# Patient Record
Sex: MALE | Race: WHITE | URBAN - METROPOLITAN AREA
[De-identification: names, ages, dates, MRNs, and addresses within clinical notes are randomized per-mention and may not be internally consistent; named-entity substitution may affect disease eponyms.]

---

## 2024-01-23 ENCOUNTER — APPOINTMENT (RX ONLY)
Dept: URBAN - METROPOLITAN AREA CLINIC 51 | Facility: CLINIC | Age: 79
Setting detail: DERMATOLOGY
End: 2024-01-23

## 2024-01-23 PROBLEM — C44.719 BASAL CELL CARCINOMA OF SKIN OF LEFT LOWER LIMB, INCLUDING HIP: Status: ACTIVE | Noted: 2024-01-23

## 2024-01-23 PROCEDURE — 99203 OFFICE O/P NEW LOW 30 MIN: CPT

## 2024-01-23 PROCEDURE — ? CONSULTATION FOR SURGERY

## 2024-01-23 NOTE — PROCEDURE: CONSULTATION FOR SURGERY
Detail Level: Detailed
Size Of Lesion: 1.7
X Size Of Lesion In Cm (Optional): 1.5
Anatomic Location From Referring Provider: Left proximal pretibial (biopsied as Mid L shin)
Incorporate Mauc In Note: Yes

## 2024-01-23 NOTE — HPI: SURGICAL CONSULTATION
[id number]: previous_biopsy_has_been_previously_biopsied
What Is The Location Of The Lesion?: Left proximal pretibial (biopsied as Mid L shin)
Who Is Your Referring Physician?: Adam painter

## 2024-02-27 ENCOUNTER — APPOINTMENT (RX ONLY)
Dept: URBAN - METROPOLITAN AREA CLINIC 51 | Facility: CLINIC | Age: 79
Setting detail: DERMATOLOGY
End: 2024-02-27

## 2024-02-27 PROBLEM — C44.719 BASAL CELL CARCINOMA OF SKIN OF LEFT LOWER LIMB, INCLUDING HIP: Status: ACTIVE | Noted: 2024-02-27

## 2024-02-27 PROCEDURE — ? ADDITIONAL NOTES

## 2024-02-27 PROCEDURE — 99212 OFFICE O/P EST SF 10 MIN: CPT

## 2024-02-27 PROCEDURE — ? POST-OP WOUND CHECK (NO GLOBAL PERIOD)

## 2024-02-27 NOTE — PROCEDURE: ADDITIONAL NOTES
Render Risk Assessment In Note?: no
Detail Level: Simple
Additional Notes: Pt came in for unna boot placement due to upcoming Mohs next week. However, his oncologist and him would like us to hold off on this surgery until he finishes upcoming scan results/procedure. Pt will keep up advised on when he can have surgery and we will place unna boot one week prior.

## 2025-03-08 ENCOUNTER — APPOINTMENT (OUTPATIENT)
Dept: CT IMAGING | Age: 80
DRG: 205 | End: 2025-03-08
Payer: MEDICARE

## 2025-03-08 ENCOUNTER — HOSPITAL ENCOUNTER (INPATIENT)
Age: 80
LOS: 2 days | Discharge: HOME | DRG: 205 | End: 2025-03-10
Attending: EMERGENCY MEDICINE
Payer: MEDICARE

## 2025-03-08 DIAGNOSIS — R09.02 HYPOXIA: Primary | ICD-10-CM

## 2025-03-08 DIAGNOSIS — J18.9 PNEUMONIA OF BOTH LOWER LOBES DUE TO INFECTIOUS ORGANISM: ICD-10-CM

## 2025-03-08 PROBLEM — T50.905A DRUG-INDUCED PNEUMONITIS: Status: ACTIVE | Noted: 2025-03-08

## 2025-03-08 PROBLEM — E78.5 HLD (HYPERLIPIDEMIA): Status: ACTIVE | Noted: 2025-03-08

## 2025-03-08 PROBLEM — I10 PRIMARY HYPERTENSION: Status: ACTIVE | Noted: 2025-03-08

## 2025-03-08 PROBLEM — J15.9 COMMUNITY ACQUIRED BACTERIAL PNEUMONIA: Status: ACTIVE | Noted: 2025-03-08

## 2025-03-08 PROBLEM — D68.69 SECONDARY HYPERCOAGULABLE STATE: Status: ACTIVE | Noted: 2025-03-08

## 2025-03-08 PROBLEM — I48.91 ATRIAL FIBRILLATION (HCC): Status: ACTIVE | Noted: 2025-03-08

## 2025-03-08 PROBLEM — J96.01 ACUTE HYPOXIC RESPIRATORY FAILURE (HCC): Status: ACTIVE | Noted: 2025-03-08

## 2025-03-08 PROBLEM — C44.92: Status: ACTIVE | Noted: 2025-03-08

## 2025-03-08 PROBLEM — J98.4 DRUG-INDUCED PNEUMONITIS: Status: ACTIVE | Noted: 2025-03-08

## 2025-03-08 PROBLEM — C77.8: Status: ACTIVE | Noted: 2025-03-08

## 2025-03-08 LAB
ANION GAP SERPL CALC-SCNC: 13 MMOL/L (ref 7–16)
B PERT DNA SPEC QL NAA+PROBE: NOT DETECTED
BORDETELLA PARAPERTUSSIS BY PCR: NOT DETECTED
BUN SERPL-MCNC: 22 MG/DL (ref 8–23)
C PNEUM DNA SPEC QL NAA+PROBE: NOT DETECTED
CALCIUM SERPL-MCNC: 8.4 MG/DL (ref 8.8–10.2)
CHLORIDE SERPL-SCNC: 108 MMOL/L (ref 98–107)
CO2 SERPL-SCNC: 23 MMOL/L (ref 20–29)
CREAT SERPL-MCNC: 1.42 MG/DL (ref 0.8–1.3)
ERYTHROCYTE [DISTWIDTH] IN BLOOD BY AUTOMATED COUNT: 13.4 % (ref 11.9–14.6)
FLUAV RNA SPEC QL NAA+PROBE: NOT DETECTED
FLUAV SUBTYP SPEC NAA+PROBE: NOT DETECTED
FLUBV RNA SPEC QL NAA+PROBE: NOT DETECTED
FLUBV RNA SPEC QL NAA+PROBE: NOT DETECTED
GLUCOSE SERPL-MCNC: 143 MG/DL (ref 70–99)
HADV DNA SPEC QL NAA+PROBE: NOT DETECTED
HCOV 229E RNA SPEC QL NAA+PROBE: NOT DETECTED
HCOV HKU1 RNA SPEC QL NAA+PROBE: NOT DETECTED
HCOV NL63 RNA SPEC QL NAA+PROBE: NOT DETECTED
HCOV OC43 RNA SPEC QL NAA+PROBE: NOT DETECTED
HCT VFR BLD AUTO: 40.3 % (ref 41.1–50.3)
HGB BLD-MCNC: 13.7 G/DL (ref 13.6–17.2)
HMPV RNA SPEC QL NAA+PROBE: NOT DETECTED
HPIV1 RNA SPEC QL NAA+PROBE: NOT DETECTED
HPIV2 RNA SPEC QL NAA+PROBE: NOT DETECTED
HPIV3 RNA SPEC QL NAA+PROBE: NOT DETECTED
HPIV4 RNA SPEC QL NAA+PROBE: NOT DETECTED
INR PPP: 2
LACTATE SERPL-SCNC: 2 MMOL/L (ref 0.5–2)
M PNEUMO DNA SPEC QL NAA+PROBE: NOT DETECTED
MCH RBC QN AUTO: 29.9 PG (ref 26.1–32.9)
MCHC RBC AUTO-ENTMCNC: 34 G/DL (ref 31.4–35)
MCV RBC AUTO: 88 FL (ref 82–102)
NRBC # BLD: 0 K/UL (ref 0–0.2)
PLATELET # BLD AUTO: 241 K/UL (ref 150–450)
PMV BLD AUTO: 8.9 FL (ref 9.4–12.3)
POTASSIUM SERPL-SCNC: 3.9 MMOL/L (ref 3.5–5.1)
PROCALCITONIN SERPL-MCNC: 0.1 NG/ML (ref 0–0.1)
PROTHROMBIN TIME: 22.3 SEC (ref 11.3–14.9)
RBC # BLD AUTO: 4.58 M/UL (ref 4.23–5.6)
RSV RNA SPEC QL NAA+PROBE: NOT DETECTED
RV+EV RNA SPEC QL NAA+PROBE: NOT DETECTED
SARS-COV-2 RDRP RESP QL NAA+PROBE: NOT DETECTED
SARS-COV-2 RNA RESP QL NAA+PROBE: NOT DETECTED
SODIUM SERPL-SCNC: 144 MMOL/L (ref 136–145)
SOURCE: NORMAL
WBC # BLD AUTO: 6.3 K/UL (ref 4.3–11.1)

## 2025-03-08 PROCEDURE — 6360000002 HC RX W HCPCS: Performed by: EMERGENCY MEDICINE

## 2025-03-08 PROCEDURE — 84145 PROCALCITONIN (PCT): CPT

## 2025-03-08 PROCEDURE — 87635 SARS-COV-2 COVID-19 AMP PRB: CPT

## 2025-03-08 PROCEDURE — 97530 THERAPEUTIC ACTIVITIES: CPT

## 2025-03-08 PROCEDURE — 96374 THER/PROPH/DIAG INJ IV PUSH: CPT

## 2025-03-08 PROCEDURE — 6360000004 HC RX CONTRAST MEDICATION: Performed by: EMERGENCY MEDICINE

## 2025-03-08 PROCEDURE — 2500000003 HC RX 250 WO HCPCS

## 2025-03-08 PROCEDURE — 87040 BLOOD CULTURE FOR BACTERIA: CPT

## 2025-03-08 PROCEDURE — 2580000003 HC RX 258: Performed by: EMERGENCY MEDICINE

## 2025-03-08 PROCEDURE — 0202U NFCT DS 22 TRGT SARS-COV-2: CPT

## 2025-03-08 PROCEDURE — 80048 BASIC METABOLIC PNL TOTAL CA: CPT

## 2025-03-08 PROCEDURE — 1100000003 HC PRIVATE W/ TELEMETRY

## 2025-03-08 PROCEDURE — 87641 MR-STAPH DNA AMP PROBE: CPT

## 2025-03-08 PROCEDURE — 85027 COMPLETE CBC AUTOMATED: CPT

## 2025-03-08 PROCEDURE — 85610 PROTHROMBIN TIME: CPT

## 2025-03-08 PROCEDURE — 83605 ASSAY OF LACTIC ACID: CPT

## 2025-03-08 PROCEDURE — 97161 PT EVAL LOW COMPLEX 20 MIN: CPT

## 2025-03-08 PROCEDURE — 6360000002 HC RX W HCPCS

## 2025-03-08 PROCEDURE — 99285 EMERGENCY DEPT VISIT HI MDM: CPT

## 2025-03-08 PROCEDURE — 36415 COLL VENOUS BLD VENIPUNCTURE: CPT

## 2025-03-08 PROCEDURE — 71260 CT THORAX DX C+: CPT

## 2025-03-08 PROCEDURE — 2580000003 HC RX 258

## 2025-03-08 PROCEDURE — 87502 INFLUENZA DNA AMP PROBE: CPT

## 2025-03-08 PROCEDURE — 6370000000 HC RX 637 (ALT 250 FOR IP)

## 2025-03-08 RX ORDER — PRAVASTATIN SODIUM 20 MG
20 TABLET ORAL DAILY
COMMUNITY

## 2025-03-08 RX ORDER — ENOXAPARIN SODIUM 100 MG/ML
30 INJECTION SUBCUTANEOUS 2 TIMES DAILY
Status: DISCONTINUED | OUTPATIENT
Start: 2025-03-08 | End: 2025-03-08

## 2025-03-08 RX ORDER — AMLODIPINE BESYLATE 5 MG/1
5 TABLET ORAL DAILY
COMMUNITY

## 2025-03-08 RX ORDER — POLYETHYLENE GLYCOL 3350 17 G/17G
17 POWDER, FOR SOLUTION ORAL DAILY PRN
Status: DISCONTINUED | OUTPATIENT
Start: 2025-03-08 | End: 2025-03-10 | Stop reason: HOSPADM

## 2025-03-08 RX ORDER — SODIUM CHLORIDE 9 MG/ML
INJECTION, SOLUTION INTRAVENOUS PRN
Status: DISCONTINUED | OUTPATIENT
Start: 2025-03-08 | End: 2025-03-10 | Stop reason: HOSPADM

## 2025-03-08 RX ORDER — POTASSIUM CHLORIDE 7.45 MG/ML
10 INJECTION INTRAVENOUS PRN
Status: DISCONTINUED | OUTPATIENT
Start: 2025-03-08 | End: 2025-03-10 | Stop reason: HOSPADM

## 2025-03-08 RX ORDER — POTASSIUM CHLORIDE 1500 MG/1
40 TABLET, EXTENDED RELEASE ORAL PRN
Status: DISCONTINUED | OUTPATIENT
Start: 2025-03-08 | End: 2025-03-10 | Stop reason: HOSPADM

## 2025-03-08 RX ORDER — SODIUM CHLORIDE 9 MG/ML
INJECTION, SOLUTION INTRAVENOUS CONTINUOUS
Status: DISCONTINUED | OUTPATIENT
Start: 2025-03-08 | End: 2025-03-09

## 2025-03-08 RX ORDER — AZITHROMYCIN 250 MG/1
500 TABLET, FILM COATED ORAL DAILY
Status: COMPLETED | OUTPATIENT
Start: 2025-03-08 | End: 2025-03-10

## 2025-03-08 RX ORDER — AMIODARONE HYDROCHLORIDE 200 MG/1
100 TABLET ORAL DAILY
Status: DISCONTINUED | OUTPATIENT
Start: 2025-03-08 | End: 2025-03-10 | Stop reason: HOSPADM

## 2025-03-08 RX ORDER — ACETAMINOPHEN 325 MG/1
650 TABLET ORAL EVERY 6 HOURS PRN
Status: DISCONTINUED | OUTPATIENT
Start: 2025-03-08 | End: 2025-03-10 | Stop reason: HOSPADM

## 2025-03-08 RX ORDER — LEVOTHYROXINE SODIUM 112 UG/1
112 TABLET ORAL DAILY
Status: DISCONTINUED | OUTPATIENT
Start: 2025-03-08 | End: 2025-03-10 | Stop reason: HOSPADM

## 2025-03-08 RX ORDER — PRAVASTATIN SODIUM 20 MG
20 TABLET ORAL DAILY
Status: DISCONTINUED | OUTPATIENT
Start: 2025-03-08 | End: 2025-03-10 | Stop reason: HOSPADM

## 2025-03-08 RX ORDER — IOPAMIDOL 755 MG/ML
75 INJECTION, SOLUTION INTRAVASCULAR
Status: COMPLETED | OUTPATIENT
Start: 2025-03-08 | End: 2025-03-08

## 2025-03-08 RX ORDER — ACETAMINOPHEN 650 MG/1
650 SUPPOSITORY RECTAL EVERY 6 HOURS PRN
Status: DISCONTINUED | OUTPATIENT
Start: 2025-03-08 | End: 2025-03-10 | Stop reason: HOSPADM

## 2025-03-08 RX ORDER — LOSARTAN POTASSIUM 100 MG/1
100 TABLET ORAL DAILY
COMMUNITY

## 2025-03-08 RX ORDER — LOSARTAN POTASSIUM 50 MG/1
100 TABLET ORAL DAILY
Status: DISCONTINUED | OUTPATIENT
Start: 2025-03-08 | End: 2025-03-10 | Stop reason: HOSPADM

## 2025-03-08 RX ORDER — WARFARIN SODIUM 1 MG/1
2 TABLET ORAL
Status: COMPLETED | OUTPATIENT
Start: 2025-03-08 | End: 2025-03-08

## 2025-03-08 RX ORDER — LEVOTHYROXINE SODIUM 112 UG/1
112 TABLET ORAL DAILY
COMMUNITY

## 2025-03-08 RX ORDER — HYDRALAZINE HYDROCHLORIDE 50 MG/1
50 TABLET, FILM COATED ORAL 2 TIMES DAILY
COMMUNITY

## 2025-03-08 RX ORDER — WARFARIN SODIUM 2 MG/1
2 TABLET ORAL DAILY
COMMUNITY

## 2025-03-08 RX ORDER — AMLODIPINE BESYLATE 5 MG/1
5 TABLET ORAL DAILY
Status: DISCONTINUED | OUTPATIENT
Start: 2025-03-08 | End: 2025-03-10 | Stop reason: HOSPADM

## 2025-03-08 RX ORDER — AMIODARONE HYDROCHLORIDE 100 MG/1
100 TABLET ORAL DAILY
COMMUNITY

## 2025-03-08 RX ORDER — ONDANSETRON 4 MG/1
4 TABLET, ORALLY DISINTEGRATING ORAL EVERY 8 HOURS PRN
Status: DISCONTINUED | OUTPATIENT
Start: 2025-03-08 | End: 2025-03-10 | Stop reason: HOSPADM

## 2025-03-08 RX ORDER — ONDANSETRON 2 MG/ML
4 INJECTION INTRAMUSCULAR; INTRAVENOUS EVERY 6 HOURS PRN
Status: DISCONTINUED | OUTPATIENT
Start: 2025-03-08 | End: 2025-03-10 | Stop reason: HOSPADM

## 2025-03-08 RX ORDER — SODIUM CHLORIDE 0.9 % (FLUSH) 0.9 %
5-40 SYRINGE (ML) INJECTION EVERY 12 HOURS SCHEDULED
Status: DISCONTINUED | OUTPATIENT
Start: 2025-03-08 | End: 2025-03-10 | Stop reason: HOSPADM

## 2025-03-08 RX ORDER — SODIUM CHLORIDE 0.9 % (FLUSH) 0.9 %
5-40 SYRINGE (ML) INJECTION PRN
Status: DISCONTINUED | OUTPATIENT
Start: 2025-03-08 | End: 2025-03-10 | Stop reason: HOSPADM

## 2025-03-08 RX ORDER — MAGNESIUM SULFATE IN WATER 40 MG/ML
2000 INJECTION, SOLUTION INTRAVENOUS PRN
Status: DISCONTINUED | OUTPATIENT
Start: 2025-03-08 | End: 2025-03-10 | Stop reason: HOSPADM

## 2025-03-08 RX ADMIN — LEVOTHYROXINE SODIUM 112 MCG: 0.11 TABLET ORAL at 11:54

## 2025-03-08 RX ADMIN — WARFARIN SODIUM 2 MG: 1 TABLET ORAL at 18:17

## 2025-03-08 RX ADMIN — WATER 40 MG: 1 INJECTION INTRAMUSCULAR; INTRAVENOUS; SUBCUTANEOUS at 16:20

## 2025-03-08 RX ADMIN — WATER 40 MG: 1 INJECTION INTRAMUSCULAR; INTRAVENOUS; SUBCUTANEOUS at 12:06

## 2025-03-08 RX ADMIN — VANCOMYCIN HYDROCHLORIDE 2000 MG: 10 INJECTION, POWDER, LYOPHILIZED, FOR SOLUTION INTRAVENOUS at 09:31

## 2025-03-08 RX ADMIN — AZITHROMYCIN DIHYDRATE 500 MG: 250 TABLET, FILM COATED ORAL at 11:54

## 2025-03-08 RX ADMIN — LOSARTAN POTASSIUM 100 MG: 50 TABLET, FILM COATED ORAL at 11:54

## 2025-03-08 RX ADMIN — PRAVASTATIN SODIUM 20 MG: 20 TABLET ORAL at 11:53

## 2025-03-08 RX ADMIN — SODIUM CHLORIDE, PRESERVATIVE FREE 10 ML: 5 INJECTION INTRAVENOUS at 10:34

## 2025-03-08 RX ADMIN — IOPAMIDOL 75 ML: 755 INJECTION, SOLUTION INTRAVENOUS at 06:16

## 2025-03-08 RX ADMIN — SODIUM CHLORIDE: 9 INJECTION, SOLUTION INTRAVENOUS at 07:46

## 2025-03-08 RX ADMIN — AMLODIPINE BESYLATE 5 MG: 5 TABLET ORAL at 11:54

## 2025-03-08 RX ADMIN — AMIODARONE HYDROCHLORIDE 100 MG: 200 TABLET ORAL at 11:54

## 2025-03-08 RX ADMIN — SODIUM CHLORIDE: 9 INJECTION, SOLUTION INTRAVENOUS at 16:20

## 2025-03-08 RX ADMIN — CEFEPIME 2000 MG: 2 INJECTION, POWDER, FOR SOLUTION INTRAVENOUS at 07:43

## 2025-03-08 RX ADMIN — CEFEPIME 2000 MG: 2 INJECTION, POWDER, FOR SOLUTION INTRAVENOUS at 20:27

## 2025-03-08 ASSESSMENT — PAIN - FUNCTIONAL ASSESSMENT: PAIN_FUNCTIONAL_ASSESSMENT: NONE - DENIES PAIN

## 2025-03-08 ASSESSMENT — LIFESTYLE VARIABLES
HOW OFTEN DO YOU HAVE A DRINK CONTAINING ALCOHOL: NEVER
HOW MANY STANDARD DRINKS CONTAINING ALCOHOL DO YOU HAVE ON A TYPICAL DAY: PATIENT DOES NOT DRINK

## 2025-03-08 NOTE — PROGRESS NOTES
4 Eyes Skin Assessment     NAME:  Osiel Coyle  YOB: 1945  MEDICAL RECORD NUMBER:  [id number]    The patient is being assessed for  Admission    I agree that at least one RN has performed a thorough Head to Toe Skin Assessment on the patient. ALL assessment sites listed below have been assessed.      Areas assessed by both nurses:    Head, Face, Ears, Shoulders, Back, Chest, Arms, Elbows, Hands, Sacrum. Buttock, Coccyx, Ischium, Legs. Feet and Heels, and Under Medical Devices         Does the Patient have a Wound? No noted wound(s)       Celestine Prevention initiated by RN: Yes  Wound Care Orders initiated by RN: No    Pressure Injury (Stage 3,4, Unstageable, DTI, NWPT, and Complex wounds) if present, place Wound referral order by RN under : No    New Ostomies, if present place, Ostomy referral order under : No     Nurse 1 eSignature: Electronically signed by Devi Hope RN on 3/8/25 at 5:37 PM EST    **SHARE this note so that the co-signing nurse can place an eSignature**    Nurse 2 eSignature: Electronically signed by Tara Cruz RN on 3/9/25 at 6:19 PM EDT

## 2025-03-08 NOTE — PLAN OF CARE
Problem: Discharge Planning  Goal: Discharge to home or other facility with appropriate resources  Outcome: Progressing  Flowsheets (Taken 3/8/2025 1027)  Discharge to home or other facility with appropriate resources: Identify barriers to discharge with patient and caregiver     Problem: Safety - Adult  Goal: Free from fall injury  Outcome: Progressing

## 2025-03-08 NOTE — H&P
Hospitalist History and Physical   Admit Date:  3/8/2025  5:10 AM   Name:  Osiel Coyle   Age:  79 y.o.  Sex:  male  :  1945   MRN:  [id number]   Room:  ER08/    Presenting/Chief Complaint: Shortness of Breath     Reason(s) for Admission: Acute hypoxic respiratory failure (HCC) [J96.01]     History of Present Illness:   Osiel Coyle is a 79 y.o. 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  Symptoms started abruptly 24 hours ago.  Denies any fevers or chills.  Denies any sinus pain, sore throat or sick contacts.  Last infusion of immunotherapy was on 3/5.  Patient endorses prior episodes of pneumonitis which responded with oral steroids.  Patient and wife are unsure of the name of the immunotherapy. Based on care everywhere, believed to be Libtayo (cemiplimab)  Currently traveling from [place].  Does not have current medication list on them.  Requiring supplemental oxygen in the ER.  CT PE negative but does show multifocal pneumonia.  Creatinine 1.42 with no appreciable leukocytosis.  Hospital medicine consulted for admission      Assessment & Plan:     Acute hypoxic respiratory failure  Secondary to drug-induced pneumonitis (grade 3/4) versus community-acquired pneumonia versus amiodarone toxicity. Desatting to 86% on exertion, requirng 3 L suppl oxygen  -Start cefepime and azithromycin  -Start methylprednisolone 1 mg/kg per day divided into 3 doses   -Monitor for clinical response over 48 hours.  -Check respiratory culture, MRSA nares, blood cultures, RPP  -Continue amiodarone, lower suspicion at this time  -Continuous pulse ox and wean oxygen as tolerated    Metastatic squamous cell carcinoma of the skin  -Complicates care.  Gets chemotherapy from cancer center in [place].  -last infusion on 3/5 for Libtayo

## 2025-03-08 NOTE — PROGRESS NOTES
Pt denies needs at this time, NAD, on tele box 9, A.fib controlled. Coumadin 2mg given per order. Admitted with c/o SOB, hypoxia. Pt on 2 Liters oxygen with Sats greater than 95%. Hourly and PRN rounds completed. Bed in lowest and locked position, call light and personal items within reach.     1850: BSSR to oncoming nurse.

## 2025-03-08 NOTE — ED PROVIDER NOTES
Emergency Department Provider Note       PCP: File, Not On   Age: 79 y.o.   Sex: male     DISPOSITION                  ICD-10-CM    1. Hypoxia  R09.02       2. Pneumonia of both lower lobes due to infectious organism  J18.9           Medical Decision Making     Given his travel history and cancer history I will get a CT of his chest to evaluate for possible PE.  ED Course as of 03/08/25 0707   Sat Mar 08, 2025   0636 Patient CT shows what appears to be bilateral infiltrates.  He wants to go back to [place].  His O2 sats have been borderline on 2 L at 90 and 91%.  Will try to get him up and walking around and see what happens. [AC]      ED Course User Index  [AC] Dat Fu MD     1 or more acute illnesses that pose a threat to life or bodily function.   Shared medical decision making was utilized in creating the patients health plan today.    I independently ordered and reviewed each unique test.       I interpreted the CT Scan bilateral lower lobe infiltrate.    The patient was admitted and I have discussed patient management with the admitting provider.          History     79-year-old gentleman who is visiting from [place] presents with concerns about shortness of breath that started last night.  He says he feels like he just cannot take a deep breath.  He is not normally on oxygen.  He does have a history of a squamous cell cancer of his leg.  He denies any specific fevers or chills and has had no nausea, vomiting, or diarrhea.    He notes that about a month ago he was told he may have some inflammation on his lungs and he was given a steroid pack for that.  He finished that about 2 weeks ago.  He had been doing well up until last night.    He denies any chest pressure, pain, or tightness.    No other associated symptoms.    Elements of this note were created using speech recognition software.  As such, errors of speech recognition may be present.        ROS     Review of  Xeljanz Counseling: I discussed with the patient the risks of Xeljanz therapy including increased risk of infection, liver issues, headache, diarrhea, or cold symptoms. Live vaccines should be avoided. They were instructed to call if they have any problems.

## 2025-03-08 NOTE — THERAPY EVALUATION
ACUTE PHYSICAL THERAPY GOALS:   (Developed with and agreed upon by patient and/or caregiver.)    (1.) Osiel Coyle  will move from supine to sit and sit to supine  and scoot up and down with INDEPENDENCE within 7 treatment day(s).    (2.) Osiel Coyle will transfer from bed to chair and chair to bed with MODIFIED INDEPENDENCE using the least restrictive device within 7 treatment day(s).    (3.) Osiel Coyle will ambulate with MODIFIED INDEPENDENCE for 150 feet with the least restrictive device within 7 treatment day(s).   (4.) Osiel Coyle will perform standing static and dynamic balance activities x 5 minutes with MODIFIED INDEPENDENCE to improve safety within 7 treatment day(s).  (5.) Osiel Coyle will perform therapeutic exercises x 15 min for HEP with INDEPENDENCE to improve strength, endurance, and functional mobility within 7 treatment day(s).       PHYSICAL THERAPY Initial Assessment, Daily Note, and PM  (Link to Caseload Tracking: PT Visit Days : 1  Acknowledge Orders  Time In/Out  PT Charge Capture  Rehab Caseload Tracker    Osiel Coyle is a 79 y.o. male   PRIMARY DIAGNOSIS: Acute hypoxic respiratory failure (HCC)  Hypoxia [R09.02]  Pneumonia of both lower lobes due to infectious organism [J18.9]  Acute hypoxic respiratory failure (HCC) [J96.01]       Reason for Referral: Generalized Muscle Weakness (M62.81)  Other abnormalities of gait and mobility (R26.89)  Inpatient: Payor: TATYANA VIVEROS MEDICARE / Plan: [facility] OFFICES ONLY / Product Type: *No Product type* /     ASSESSMENT:     REHAB RECOMMENDATIONS:   Recommendation to date pending progress:  Setting:  Home Health Therapy    Equipment:    To Be Determined     ASSESSMENT:  Mr. Coyle is a 79 year old male who presents to [facility] with c/o SOB and fatigue. He is diagnosed with multifocal PNA and acute hypoxic respiratory failure. He does not usually require O2, but is currently on 3 L with O2 sats 90-94% throughout session. At baseline, patient is modified independent

## 2025-03-08 NOTE — ED TRIAGE NOTES
Arrived via [facility], coming from a hotel, C/o SHOB x 12 hours, Squamous cell carcinoma of left leg, reports inflammation of lungs. O2 sat 91% on RA, initiated oxygen 3 LPM via NC increased to 95%.

## 2025-03-08 NOTE — ED NOTES
Patient's O2 sat 90-91% on RA at rest in bed. Assisted patient up to ambulate in hallway, O2 sat decreased to 86% on RA, patient reported SHOB with ambulation of less than 50 feet. Patient assisted back into bed and O2 sat remained 86-88% on RA even after a few minutes of rest. Restarted O2 at 3LPM via NC and O2 sat increased to 91%. Dr Fu notified of findings.      Heaven Blanca, RN  03/08/25 0656

## 2025-03-09 LAB
ALBUMIN SERPL-MCNC: 2.2 G/DL (ref 3.2–4.6)
ALBUMIN/GLOB SERPL: 0.6 (ref 1–1.9)
ALP SERPL-CCNC: 67 U/L (ref 40–129)
ALT SERPL-CCNC: 9 U/L (ref 8–55)
ANION GAP SERPL CALC-SCNC: 10 MMOL/L (ref 7–16)
AST SERPL-CCNC: 26 U/L (ref 15–37)
BASOPHILS # BLD: 0.01 K/UL (ref 0–0.2)
BASOPHILS NFR BLD: 0.1 % (ref 0–2)
BILIRUB SERPL-MCNC: 0.5 MG/DL (ref 0–1.2)
BUN SERPL-MCNC: 20 MG/DL (ref 8–23)
CALCIUM SERPL-MCNC: 8 MG/DL (ref 8.8–10.2)
CHLORIDE SERPL-SCNC: 111 MMOL/L (ref 98–107)
CO2 SERPL-SCNC: 22 MMOL/L (ref 20–29)
CREAT SERPL-MCNC: 1.14 MG/DL (ref 0.8–1.3)
DIFFERENTIAL METHOD BLD: ABNORMAL
EOSINOPHIL # BLD: 0 K/UL (ref 0–0.8)
EOSINOPHIL NFR BLD: 0 % (ref 0.5–7.8)
ERYTHROCYTE [DISTWIDTH] IN BLOOD BY AUTOMATED COUNT: 12.8 % (ref 11.9–14.6)
GLOBULIN SER CALC-MCNC: 3.7 G/DL (ref 2.3–3.5)
GLUCOSE SERPL-MCNC: 166 MG/DL (ref 70–99)
HCT VFR BLD AUTO: 37.4 % (ref 41.1–50.3)
HGB BLD-MCNC: 13 G/DL (ref 13.6–17.2)
IMM GRANULOCYTES # BLD AUTO: 0.05 K/UL (ref 0–0.5)
IMM GRANULOCYTES NFR BLD AUTO: 0.7 % (ref 0–5)
INR PPP: 2.1
LYMPHOCYTES # BLD: 0.76 K/UL (ref 0.5–4.6)
LYMPHOCYTES NFR BLD: 11.3 % (ref 13–44)
MCH RBC QN AUTO: 30.2 PG (ref 26.1–32.9)
MCHC RBC AUTO-ENTMCNC: 34.8 G/DL (ref 31.4–35)
MCV RBC AUTO: 86.8 FL (ref 82–102)
MONOCYTES # BLD: 0.12 K/UL (ref 0.1–1.3)
MONOCYTES NFR BLD: 1.8 % (ref 4–12)
NEUTS SEG # BLD: 5.76 K/UL (ref 1.7–8.2)
NEUTS SEG NFR BLD: 86.1 % (ref 43–78)
NRBC # BLD: 0 K/UL (ref 0–0.2)
PLATELET # BLD AUTO: 262 K/UL (ref 150–450)
PMV BLD AUTO: 9 FL (ref 9.4–12.3)
POTASSIUM SERPL-SCNC: 4.2 MMOL/L (ref 3.5–5.1)
PROT SERPL-MCNC: 5.9 G/DL (ref 6.3–8.2)
PROTHROMBIN TIME: 23.1 SEC (ref 11.3–14.9)
RBC # BLD AUTO: 4.31 M/UL (ref 4.23–5.6)
SODIUM SERPL-SCNC: 142 MMOL/L (ref 136–145)
WBC # BLD AUTO: 6.7 K/UL (ref 4.3–11.1)

## 2025-03-09 PROCEDURE — 2700000000 HC OXYGEN THERAPY PER DAY

## 2025-03-09 PROCEDURE — 85025 COMPLETE CBC W/AUTO DIFF WBC: CPT

## 2025-03-09 PROCEDURE — 1100000003 HC PRIVATE W/ TELEMETRY

## 2025-03-09 PROCEDURE — 6370000000 HC RX 637 (ALT 250 FOR IP)

## 2025-03-09 PROCEDURE — 80053 COMPREHEN METABOLIC PANEL: CPT

## 2025-03-09 PROCEDURE — 6360000002 HC RX W HCPCS

## 2025-03-09 PROCEDURE — 94760 N-INVAS EAR/PLS OXIMETRY 1: CPT

## 2025-03-09 PROCEDURE — 2500000003 HC RX 250 WO HCPCS

## 2025-03-09 PROCEDURE — 2580000003 HC RX 258: Performed by: EMERGENCY MEDICINE

## 2025-03-09 PROCEDURE — 36415 COLL VENOUS BLD VENIPUNCTURE: CPT

## 2025-03-09 PROCEDURE — 2580000003 HC RX 258

## 2025-03-09 PROCEDURE — 85610 PROTHROMBIN TIME: CPT

## 2025-03-09 RX ORDER — WARFARIN SODIUM 1 MG/1
2 TABLET ORAL DAILY
Status: DISCONTINUED | OUTPATIENT
Start: 2025-03-09 | End: 2025-03-10

## 2025-03-09 RX ADMIN — AMIODARONE HYDROCHLORIDE 100 MG: 200 TABLET ORAL at 09:09

## 2025-03-09 RX ADMIN — WATER 40 MG: 1 INJECTION INTRAMUSCULAR; INTRAVENOUS; SUBCUTANEOUS at 09:01

## 2025-03-09 RX ADMIN — PRAVASTATIN SODIUM 20 MG: 20 TABLET ORAL at 09:09

## 2025-03-09 RX ADMIN — CEFEPIME 2000 MG: 2 INJECTION, POWDER, FOR SOLUTION INTRAVENOUS at 09:20

## 2025-03-09 RX ADMIN — SODIUM CHLORIDE: 9 INJECTION, SOLUTION INTRAVENOUS at 09:00

## 2025-03-09 RX ADMIN — LOSARTAN POTASSIUM 100 MG: 50 TABLET, FILM COATED ORAL at 09:11

## 2025-03-09 RX ADMIN — WARFARIN SODIUM 2 MG: 1 TABLET ORAL at 17:18

## 2025-03-09 RX ADMIN — SODIUM CHLORIDE, PRESERVATIVE FREE 10 ML: 5 INJECTION INTRAVENOUS at 09:15

## 2025-03-09 RX ADMIN — SODIUM CHLORIDE, PRESERVATIVE FREE 10 ML: 5 INJECTION INTRAVENOUS at 20:04

## 2025-03-09 RX ADMIN — AMLODIPINE BESYLATE 5 MG: 5 TABLET ORAL at 09:09

## 2025-03-09 RX ADMIN — LEVOTHYROXINE SODIUM 112 MCG: 0.11 TABLET ORAL at 05:39

## 2025-03-09 RX ADMIN — SODIUM CHLORIDE: 9 INJECTION, SOLUTION INTRAVENOUS at 00:17

## 2025-03-09 RX ADMIN — CEFEPIME 2000 MG: 2 INJECTION, POWDER, FOR SOLUTION INTRAVENOUS at 17:17

## 2025-03-09 RX ADMIN — WATER 40 MG: 1 INJECTION INTRAMUSCULAR; INTRAVENOUS; SUBCUTANEOUS at 17:23

## 2025-03-09 RX ADMIN — AZITHROMYCIN DIHYDRATE 500 MG: 250 TABLET, FILM COATED ORAL at 09:09

## 2025-03-09 RX ADMIN — WATER 40 MG: 1 INJECTION INTRAMUSCULAR; INTRAVENOUS; SUBCUTANEOUS at 00:18

## 2025-03-09 NOTE — PLAN OF CARE
Problem: Discharge Planning  Goal: Discharge to home or other facility with appropriate resources  3/8/2025 2356 by Natasha Cerrato, RN  Outcome: Progressing  Flowsheets (Taken 3/8/2025 1925)  Discharge to home or other facility with appropriate resources:   Identify barriers to discharge with patient and caregiver   Arrange for needed discharge resources and transportation as appropriate   Identify discharge learning needs (meds, wound care, etc)  3/8/2025 1319 by Devi Hope, RN  Outcome: Progressing  Flowsheets (Taken 3/8/2025 1027)  Discharge to home or other facility with appropriate resources: Identify barriers to discharge with patient and caregiver     Problem: Safety - Adult  Goal: Free from fall injury  3/8/2025 2356 by Natasha Cerrato, RN  Outcome: Progressing  3/8/2025 1319 by Devi Hope, RN  Outcome: Progressing

## 2025-03-09 NOTE — PROGRESS NOTES
Brief Nutrition Note:     Patient screened out for home tube feeding. He doesn't have enteral access documented. Confirmed with patient and RNDevi that this is incorrect documentation. Patient denies having a PEG/enteral access. Patient reports eating well prior to admit. Endorses intentional weight loss prior to admit. No other complaints for me today.     Harriet Dukes, RD

## 2025-03-09 NOTE — PROGRESS NOTES
Warfarin dosing per pharmacist    Osiel Coyle is a 79 y.o. male.    Height: 175.3 cm (5' 9\")  Weight - Scale: 102.9 kg (226 lb 12.8 oz)    Indication:  AFib    Goal INR:  2 to 3    Home dose:  1 mg on Friday, 2 mg all other days of the week per PTA med list    Risk factors or significant drug interactions:  Amiodarone (PTA med), Azithromycin (3/8 to present), steroids    Other anticoagulants:  N/A    Lab Results   Component Value Date/Time    HGB 13.0 03/09/2025 06:14 AM    HGB 13.7 03/08/2025 05:29 AM       Daily Monitoring  Date  INR     Warfarin dose Notes  3/8  2.0  2 mg    3/9  2.1  2 mg      Pharmacy consulted to dose warfarin in patient with history of AFIb who presented with therapeutic INR of 2.0.    Per chart, patient is travelling through from [place], so limited information available.    Home warfarin dose listed above per PTA med list, but unable to confirm via other means as patient lives in [place].    INR remains therapeutic at 2.1, so continue usual home dose of 2 mg this evening.     Will need to be cautious as drug interaction with azithromycin may cause increased warfarin sensitivity.    Daily INR for now.    Thank you,  Kevon Sabillon [facility]

## 2025-03-09 NOTE — PROGRESS NOTES
Pt denies needs at this time, NAD, new PIV inserted into Left posterior forearm. IV steroids, IV antibiotics and coumadin administered. IS given and pt instructed on use, pt demonstrates correct use and pulls 1500 on IS. Hourly rounds completed. Bed in low and locked position, call light and personal items within reach.     1851: BSSR to oncoming nurse.

## 2025-03-09 NOTE — PLAN OF CARE
Problem: Discharge Planning  Goal: Discharge to home or other facility with appropriate resources  3/9/2025 1419 by Devi Hope, RN  Outcome: Progressing  3/8/2025 2356 by Natasha Cerrato, RN  Outcome: Progressing  Flowsheets (Taken 3/8/2025 1925)  Discharge to home or other facility with appropriate resources:   Identify barriers to discharge with patient and caregiver   Arrange for needed discharge resources and transportation as appropriate   Identify discharge learning needs (meds, wound care, etc)     Problem: Safety - Adult  Goal: Free from fall injury  3/9/2025 1419 by Devi Hope, RN  Outcome: Progressing  3/8/2025 2356 by Natasha Cerrato, RN  Outcome: Progressing

## 2025-03-09 NOTE — PROGRESS NOTES
Hospitalist Progress Note   Admit Date:  3/8/2025  5:10 AM   Name:  Osiel Coyle   Age:  79 y.o.  Sex:  male  :  1945   MRN:  [id number]   Room:  [facility]/    Presenting/Chief Complaint: Shortness of Breath     Reason(s) for Admission: Hypoxia [R09.02]  Pneumonia of both lower lobes due to infectious organism [J18.9]  Acute hypoxic respiratory failure (HCC) [J96.01]     Hospital Course:   Osiel Coyle is a 79 y.o. male with medical history of hypertension, hyperlipidemia, A-fib on warfarin, metastatic squamous cell carcinoma on immunotherapy with Cemiplimab, who presented to the hospital with shortness of breath.  Patient lives in [place] and is visiting [place] for the week for his grandsons baseball game.  Patient reports his symptoms started about 24 hours prior to coming to the ER.  Denies any fevers or chills, or productive cough at home.  He had similar symptoms after getting his last immunotherapy infusion which resolved with a course of oral steroids.  Patient was requiring supplemental oxygen in the emergency room.  CT chest PE protocol was negative for pulmonary embolism.  Lungs noted to have patchy bilateral pulmonary opacities notably at the bases and was called multifocal pneumonia.  Patient started on IV steroids, IV antibiotics.     Subjective & 24hr Events:   No overnight events reported.  Patient reports feeling better today.  Oxygen was off this morning.  Denied any other complaints.  Was asking to go home tomorrow.      Assessment & Plan:     Acute hypoxic respiratory failure  Drug-induced pneumonitis  Multifocal pneumonia  -CT chest reviewed.  Given timeframe more likely this is drug-induced pneumonitis due to his recent cemiplimab treatment  - Continue IV steroids  - On cefepime and Zithromax  -MRSA nares negative.  Blood cultures no growth to date.  -Wean oxygen as tolerated     Metastatic squamous cell carcinoma of the skin  -Gets chemotherapy from cancer center in [place].  -Last

## 2025-03-09 NOTE — PROGRESS NOTES
No new concerns voiced at this time. Pt remains on 3L NC with spO2 >90%. No s/s of respiratory distress. Hourly rounds completed, all needs met this shift. Bed in L/L with call light in reach. Report to be given to dayshift nurse.

## 2025-03-10 VITALS
DIASTOLIC BLOOD PRESSURE: 89 MMHG | BODY MASS INDEX: 33.59 KG/M2 | HEART RATE: 104 BPM | RESPIRATION RATE: 18 BRPM | WEIGHT: 226.8 LBS | HEIGHT: 69 IN | OXYGEN SATURATION: 92 % | SYSTOLIC BLOOD PRESSURE: 128 MMHG | TEMPERATURE: 98.1 F

## 2025-03-10 LAB
ALBUMIN SERPL-MCNC: 2.2 G/DL (ref 3.2–4.6)
ALBUMIN/GLOB SERPL: 0.7 (ref 1–1.9)
ALP SERPL-CCNC: 66 U/L (ref 40–129)
ALT SERPL-CCNC: 10 U/L (ref 8–55)
ANION GAP SERPL CALC-SCNC: 10 MMOL/L (ref 7–16)
AST SERPL-CCNC: 22 U/L (ref 15–37)
BASOPHILS # BLD: 0.01 K/UL (ref 0–0.2)
BASOPHILS NFR BLD: 0.1 % (ref 0–2)
BILIRUB SERPL-MCNC: 0.3 MG/DL (ref 0–1.2)
BUN SERPL-MCNC: 27 MG/DL (ref 8–23)
CALCIUM SERPL-MCNC: 8 MG/DL (ref 8.8–10.2)
CHLORIDE SERPL-SCNC: 114 MMOL/L (ref 98–107)
CO2 SERPL-SCNC: 20 MMOL/L (ref 20–29)
CREAT SERPL-MCNC: 1.16 MG/DL (ref 0.8–1.3)
DIFFERENTIAL METHOD BLD: ABNORMAL
EOSINOPHIL # BLD: 0 K/UL (ref 0–0.8)
EOSINOPHIL NFR BLD: 0 % (ref 0.5–7.8)
ERYTHROCYTE [DISTWIDTH] IN BLOOD BY AUTOMATED COUNT: 12.9 % (ref 11.9–14.6)
GLOBULIN SER CALC-MCNC: 3.4 G/DL (ref 2.3–3.5)
GLUCOSE SERPL-MCNC: 165 MG/DL (ref 70–99)
HCT VFR BLD AUTO: 37.6 % (ref 41.1–50.3)
HGB BLD-MCNC: 12.4 G/DL (ref 13.6–17.2)
IMM GRANULOCYTES # BLD AUTO: 0.09 K/UL (ref 0–0.5)
IMM GRANULOCYTES NFR BLD AUTO: 0.7 % (ref 0–5)
INR PPP: 2.7
LYMPHOCYTES # BLD: 0.94 K/UL (ref 0.5–4.6)
LYMPHOCYTES NFR BLD: 7 % (ref 13–44)
MCH RBC QN AUTO: 29.8 PG (ref 26.1–32.9)
MCHC RBC AUTO-ENTMCNC: 33 G/DL (ref 31.4–35)
MCV RBC AUTO: 90.4 FL (ref 82–102)
MONOCYTES # BLD: 0.37 K/UL (ref 0.1–1.3)
MONOCYTES NFR BLD: 2.7 % (ref 4–12)
NEUTS SEG # BLD: 12.1 K/UL (ref 1.7–8.2)
NEUTS SEG NFR BLD: 89.5 % (ref 43–78)
NRBC # BLD: 0 K/UL (ref 0–0.2)
PLATELET # BLD AUTO: 282 K/UL (ref 150–450)
PMV BLD AUTO: 9.2 FL (ref 9.4–12.3)
POTASSIUM SERPL-SCNC: 3.8 MMOL/L (ref 3.5–5.1)
PROT SERPL-MCNC: 5.6 G/DL (ref 6.3–8.2)
PROTHROMBIN TIME: 28.5 SEC (ref 11.3–14.9)
RBC # BLD AUTO: 4.16 M/UL (ref 4.23–5.6)
SODIUM SERPL-SCNC: 144 MMOL/L (ref 136–145)
WBC # BLD AUTO: 13.5 K/UL (ref 4.3–11.1)

## 2025-03-10 PROCEDURE — 85025 COMPLETE CBC W/AUTO DIFF WBC: CPT

## 2025-03-10 PROCEDURE — 97165 OT EVAL LOW COMPLEX 30 MIN: CPT

## 2025-03-10 PROCEDURE — 85610 PROTHROMBIN TIME: CPT

## 2025-03-10 PROCEDURE — 2500000003 HC RX 250 WO HCPCS

## 2025-03-10 PROCEDURE — 6370000000 HC RX 637 (ALT 250 FOR IP)

## 2025-03-10 PROCEDURE — 36415 COLL VENOUS BLD VENIPUNCTURE: CPT

## 2025-03-10 PROCEDURE — 97535 SELF CARE MNGMENT TRAINING: CPT

## 2025-03-10 PROCEDURE — 2580000003 HC RX 258

## 2025-03-10 PROCEDURE — 6360000002 HC RX W HCPCS

## 2025-03-10 PROCEDURE — 80053 COMPREHEN METABOLIC PANEL: CPT

## 2025-03-10 RX ORDER — CEFDINIR 300 MG/1
300 CAPSULE ORAL 2 TIMES DAILY
Qty: 6 CAPSULE | Refills: 0 | Status: SHIPPED | OUTPATIENT
Start: 2025-03-10 | End: 2025-03-13

## 2025-03-10 RX ORDER — WARFARIN SODIUM 1 MG/1
0.5 TABLET ORAL
Status: DISCONTINUED | OUTPATIENT
Start: 2025-03-10 | End: 2025-03-10 | Stop reason: HOSPADM

## 2025-03-10 RX ORDER — METHYLPREDNISOLONE 4 MG/1
TABLET ORAL
Qty: 1 KIT | Refills: 0 | Status: SHIPPED | OUTPATIENT
Start: 2025-03-10

## 2025-03-10 RX ORDER — WARFARIN SODIUM 1 MG/1
2 TABLET ORAL DAILY
Status: DISCONTINUED | OUTPATIENT
Start: 2025-03-11 | End: 2025-03-10 | Stop reason: HOSPADM

## 2025-03-10 RX ADMIN — CEFEPIME 2000 MG: 2 INJECTION, POWDER, FOR SOLUTION INTRAVENOUS at 01:40

## 2025-03-10 RX ADMIN — WATER 40 MG: 1 INJECTION INTRAMUSCULAR; INTRAVENOUS; SUBCUTANEOUS at 00:39

## 2025-03-10 RX ADMIN — AZITHROMYCIN DIHYDRATE 500 MG: 250 TABLET, FILM COATED ORAL at 09:47

## 2025-03-10 RX ADMIN — PRAVASTATIN SODIUM 20 MG: 20 TABLET ORAL at 09:46

## 2025-03-10 RX ADMIN — WATER 40 MG: 1 INJECTION INTRAMUSCULAR; INTRAVENOUS; SUBCUTANEOUS at 09:42

## 2025-03-10 RX ADMIN — AMIODARONE HYDROCHLORIDE 100 MG: 200 TABLET ORAL at 09:47

## 2025-03-10 RX ADMIN — AMLODIPINE BESYLATE 5 MG: 5 TABLET ORAL at 09:47

## 2025-03-10 RX ADMIN — LOSARTAN POTASSIUM 100 MG: 50 TABLET, FILM COATED ORAL at 09:47

## 2025-03-10 RX ADMIN — LEVOTHYROXINE SODIUM 112 MCG: 0.11 TABLET ORAL at 05:42

## 2025-03-10 RX ADMIN — SODIUM CHLORIDE, PRESERVATIVE FREE 10 ML: 5 INJECTION INTRAVENOUS at 09:41

## 2025-03-10 NOTE — DISCHARGE INSTRUCTIONS
Discharge instructions given. Education provided.  Medication changes discussed with patient/family and follow up appointments discussed with patient/family.  Prescriptions provided.  AVS reviewed, signed, and placed in chart.  Copy provided to patient.  PIV removed with catheter intact, no signs or symptoms of infection noted to include no redness, no warmth, and no edema. All questions answered and patient/family have verbally voiced understanding.

## 2025-03-10 NOTE — PROGRESS NOTES
No new concerns voiced at this time. Pt weaned to 1L NC with spO2 >90%. No s/s of respiratory distress. Hourly rounds completed, all needs met this shift. Bed in L/L with call light in reach. Report to be given to dayshift nurse.       0645: Pt weaned to room air with spO2 >90%.

## 2025-03-10 NOTE — PROGRESS NOTES
Warfarin dosing per pharmacist    Osiel Coyle is a 79 y.o. male.    Height: 175.3 cm (5' 9\")  Weight - Scale: 102.9 kg (226 lb 12.8 oz)    Indication:  AFib    Goal INR:  2 to 3    Home dose:  1 mg on Friday, 2 mg all other days of the week per PTA med list    Risk factors or significant drug interactions:  Amiodarone (PTA med), Azithromycin (3/8 to present), steroids    Other anticoagulants:  N/A    Lab Results   Component Value Date/Time    HGB 12.4 03/10/2025 04:14 AM    HGB 13.0 03/09/2025 06:14 AM    HGB 13.7 03/08/2025 05:29 AM     Lab Results   Component Value Date/Time    INR 2.7 03/10/2025 04:14 AM        Daily Monitoring  Date  INR     Warfarin dose Notes  3/8  2.0  2 mg    3/9  2.1  2 mg  3/10  2.7  0.5 mg    Pharmacy consulted to dose warfarin in patient with history of AFIb who presented with therapeutic INR of 2.0.    Per chart, patient is travelling through from [place], so limited information available.    Home warfarin dose listed above per PTA med list, but unable to confirm via other means as patient lives in [place].    INR increased fairly rapidly to 2.7, this may be secondary to the drug interaction with azithromycin and methylprednisolone. Will give 0.5 mg today. Azithromycin therapy has completed, however methylprednisolone continues. May need a decreased dose while steroid therapy continues    Daily INR for now.    Thank you,  Nikolai Harmon [facility]

## 2025-03-10 NOTE — DISCHARGE SUMMARY
Hospitalist Discharge Summary   Admit Date:  3/8/2025  5:10 AM   DC Note date: 3/10/2025  Name:  Osiel Coyle   Age:  79 y.o.  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 *      Hospital Course:  Osiel Coyle is a 79 y.o. male with medical history of hypertension, hyperlipidemia, A-fib on warfarin, metastatic squamous cell carcinoma on immunotherapy with Cemiplimab, who presented to the hospital with shortness of breath.  Patient lives in [place] and is visiting [place] for the week for his [employer] baseball game.  Patient reports his symptoms started about 24 hours prior to coming to the ER.  Denies any fevers or chills, or productive cough at home.  He had similar symptoms after getting his last immunotherapy infusion which resolved with a course of oral steroids.  Patient was requiring supplemental oxygen in the emergency room.  CT chest PE protocol was negative for pulmonary embolism.  Lungs noted to have patchy bilateral pulmonary opacities notably at the bases and was called multifocal pneumonia.  Patient started on IV steroids, IV antibiotics.    Patient significantly improved after 24 hours and able to wean off of supplemental oxygen to room air.  Discussed plan with patient and patient's wife on day of

## 2025-03-10 NOTE — PLAN OF CARE
Problem: Discharge Planning  Goal: Discharge to home or other facility with appropriate resources  3/10/2025 0959 by Nanci Stauffer, RN  Outcome: Adequate for Discharge  3/10/2025 0055 by Natasha Cerrato, RN  Outcome: Progressing  Flowsheets (Taken 3/9/2025 1925)  Discharge to home or other facility with appropriate resources:   Identify barriers to discharge with patient and caregiver   Arrange for needed discharge resources and transportation as appropriate   Identify discharge learning needs (meds, wound care, etc)     Problem: Safety - Adult  Goal: Free from fall injury  3/10/2025 0959 by Nanci Stauffer, RN  Outcome: Adequate for Discharge  3/10/2025 0055 by Natasha Cerrato, RN  Outcome: Progressing

## 2025-03-10 NOTE — CARE COORDINATION
Per IDR, pt is from [place], has a discharge order for today. CM went to assess pt and he had already discharged.

## 2025-03-10 NOTE — PLAN OF CARE
Problem: Discharge Planning  Goal: Discharge to home or other facility with appropriate resources  3/10/2025 0055 by Natasha Cerrato, RN  Outcome: Progressing  Flowsheets (Taken 3/9/2025 1925)  Discharge to home or other facility with appropriate resources:   Identify barriers to discharge with patient and caregiver   Arrange for needed discharge resources and transportation as appropriate   Identify discharge learning needs (meds, wound care, etc)  3/9/2025 1419 by Devi Hope, RN  Outcome: Progressing     Problem: Safety - Adult  Goal: Free from fall injury  3/10/2025 0055 by Natasha Cerrato, RN  Outcome: Progressing  3/9/2025 1419 by Devi Hope, RN  Outcome: Progressing

## 2025-03-10 NOTE — PROGRESS NOTES
ACUTE OCCUPATIONAL THERAPY GOALS:   (Developed with and agreed upon by patient and/or caregiver.)  1. Pt will complete functional mobility for ADLs mod I  2. Pt will complete lower body dressing mod I using AE as needed  3. Pt will complete grooming and hygiene at sink independently  4.. Pt will independently demonstrate/ verbalize 2+ energy conservation techniques to promote increased endurance for ADLs    Goals met      OCCUPATIONAL THERAPY Initial Assessment, Daily Note, and Discharge       OT Visit Days: 1  Acknowledge Orders  Time  OT Charge Capture  Rehab Caseload Tracker      Osiel Coyle is a 79 y.o. male   PRIMARY DIAGNOSIS: Acute hypoxic respiratory failure (HCC)  Hypoxia [R09.02]  Pneumonia of both lower lobes due to infectious organism [J18.9]  Acute hypoxic respiratory failure (HCC) [J96.01]       Reason for Referral: Generalized Muscle Weakness (M62.81)  Inpatient: Payor: TATYANA VIVEROS MEDICARE / Plan: [facility] OFFICES ONLY / Product Type: *No Product type* /     ASSESSMENT:     REHAB RECOMMENDATIONS:   Recommendation to date pending progress:  Setting:  No further skilled occupational therapy after discharge from hospital    Equipment:    None     ASSESSMENT:  Mr. Coyle remains independent- mod I with ADLs and with functional mobility for ADLs using cane and reacher. Pt presented with decreased activity tolerance, mildly SOB and desaturated to 89% with activity on room air (>90% within ~30 seconds). Pt educated on energy conservation techniques and verbalized understanding. Pt does not require further skilled OT services at this time, no d/c needs.      [facility] AM-PAC™ “6 Clicks” Daily Activity Inpatient Short Form:    AM-PAC Daily Activity - Inpatient   How much help is needed for putting on and taking off regular lower body clothing?: None  How much help is needed for bathing (which includes washing, rinsing, drying)?: None  How much help is needed for toileting (which includes using toilet,

## 2025-03-13 LAB
BACTERIA SPEC CULT: NORMAL
BACTERIA SPEC CULT: NORMAL
MRSA DNA SPEC QL NAA+PROBE: NOT DETECTED
S AUREUS CPE NOSE QL NAA+PROBE: NOT DETECTED
SERVICE CMNT-IMP: NORMAL
SERVICE CMNT-IMP: NORMAL